# Patient Record
Sex: FEMALE | Race: OTHER | NOT HISPANIC OR LATINO
[De-identification: names, ages, dates, MRNs, and addresses within clinical notes are randomized per-mention and may not be internally consistent; named-entity substitution may affect disease eponyms.]

---

## 2021-05-22 PROBLEM — Z00.00 ENCOUNTER FOR PREVENTIVE HEALTH EXAMINATION: Status: ACTIVE | Noted: 2021-05-22

## 2021-05-24 ENCOUNTER — APPOINTMENT (OUTPATIENT)
Dept: OBGYN | Facility: CLINIC | Age: 76
End: 2021-05-24
Payer: MEDICARE

## 2021-05-24 VITALS — TEMPERATURE: 97.7 F | BODY MASS INDEX: 23.05 KG/M2 | WEIGHT: 135 LBS | HEIGHT: 64 IN

## 2021-05-24 DIAGNOSIS — I10 ESSENTIAL (PRIMARY) HYPERTENSION: ICD-10-CM

## 2021-05-24 LAB
BILIRUB UR QL STRIP: NORMAL
CLARITY UR: CLEAR
GLUCOSE UR-MCNC: NORMAL
HCG UR QL: NORMAL EU/DL
HGB UR QL STRIP.AUTO: NORMAL
KETONES UR-MCNC: NORMAL
LEUKOCYTE ESTERASE UR QL STRIP: NORMAL
NITRITE UR QL STRIP: NORMAL
PH UR STRIP: 5.5
PROT UR STRIP-MCNC: NORMAL
SP GR UR STRIP: 1.03

## 2021-05-24 PROCEDURE — 81003 URINALYSIS AUTO W/O SCOPE: CPT | Mod: QW

## 2021-05-24 PROCEDURE — 99387 INIT PM E/M NEW PAT 65+ YRS: CPT

## 2021-05-24 PROCEDURE — 99072 ADDL SUPL MATRL&STAF TM PHE: CPT

## 2021-06-07 LAB — CYTOLOGY CVX/VAG DOC THIN PREP: ABNORMAL

## 2023-08-24 ENCOUNTER — APPOINTMENT (OUTPATIENT)
Dept: OBGYN | Facility: CLINIC | Age: 78
End: 2023-08-24
Payer: MEDICARE

## 2023-08-24 VITALS — TEMPERATURE: 98.2 F | HEIGHT: 64 IN | BODY MASS INDEX: 21.85 KG/M2 | WEIGHT: 128 LBS

## 2023-08-24 DIAGNOSIS — R39.15 URGENCY OF URINATION: ICD-10-CM

## 2023-08-24 DIAGNOSIS — Z85.3 PERSONAL HISTORY OF MALIGNANT NEOPLASM OF BREAST: ICD-10-CM

## 2023-08-24 DIAGNOSIS — N95.2 POSTMENOPAUSAL ATROPHIC VAGINITIS: ICD-10-CM

## 2023-08-24 DIAGNOSIS — Z01.411 ENCOUNTER FOR GYNECOLOGICAL EXAMINATION (GENERAL) (ROUTINE) WITH ABNORMAL FINDINGS: ICD-10-CM

## 2023-08-24 LAB
BILIRUB UR QL STRIP: NORMAL
GLUCOSE UR-MCNC: NORMAL
HCG UR QL: 0.2 EU/DL
HGB UR QL STRIP.AUTO: NORMAL
KETONES UR-MCNC: NORMAL
LEUKOCYTE ESTERASE UR QL STRIP: NORMAL
NITRITE UR QL STRIP: POSITIVE
PH UR STRIP: 6
PROT UR STRIP-MCNC: NORMAL
SP GR UR STRIP: 1.02

## 2023-08-24 PROCEDURE — 99397 PER PM REEVAL EST PAT 65+ YR: CPT | Mod: 25

## 2023-08-24 PROCEDURE — 81003 URINALYSIS AUTO W/O SCOPE: CPT | Mod: QW

## 2023-08-24 NOTE — PHYSICAL EXAM
[FreeTextEntry7] : Abdomen is soft nontender with no palpable masses. [Normal] : normal [FreeTextEntry1] : External genitalia had no lesions identifiable and the patient was noted to have moderate to severe atrophic changes of the labia and clitoral region. [FreeTextEntry4] : There appeared to be a slight midline cystocele but no prolapse. [FreeTextEntry5] : Cervix appeared normal with moderate to severe atrophic changes but no lesions.  No Pap test was taken. [FreeTextEntry6] : The uterus and adnexa felt to be normal in size and nontender on exam. [FreeTextEntry9] : The patient had no rectal complaints and no rectal exam was performed. carl

## 2023-08-24 NOTE — DISCUSSION/SUMMARY
[FreeTextEntry1] : This is her first GYN exam since May 2021.  She was found to have moderate to severe atrophic changes of the external genitalia and vagina and cervix.  There was a very slight mild cystocele.  Because of her age and history of no abnormal Pap testing in the past I have recommended that Pap testing be terminated at this point in her life.  On urinalysis today she was found to have positive nitrites and some slight blood and white blood cells.  Because of her urgency of urination she will have a urine culture and sensitivity performed.  Any further treatment will be dependent on that result.  Otherwise the patient will return to this office as needed.

## 2023-08-24 NOTE — HISTORY OF PRESENT ILLNESS
[FreeTextEntry1] : This 78-year-old female  3 para 2-0-1-2 is here for an annual GYN exam.  Her last Pap test was normal on May 24, 2021.  She states that she has never had any abnormal Pap testing in the past.  She does have a history of bilateral mastectomy due to breast cancer.  She also has bilateral breast implants.  This was diagnosed and surgery performed approximately 14 years ago.  She is complaining today of some stress urinary incontinence when her bladder is full.  She is noticing urgency of urination on a somewhat regular basis.  She has had 2 spontaneous vaginal deliveries and 1 spontaneous .  Family history includes a mother who had breast cancer.  This patient is noticing a pressure-like feeling in the urethra when she voids.

## 2023-08-24 NOTE — REVIEW OF SYSTEMS
[Urgency] : urgency [Frequency] : frequency [Negative] : Heme/Lymph [FreeTextEntry8] : She does state that when her bladder is full occasionally she has incontinence. [de-identified] : Patient had bilateral mastectomy and bilateral breast implants approximately 14 years ago.

## 2023-09-07 DIAGNOSIS — N30.00 ACUTE CYSTITIS W/OUT HEMATURIA: ICD-10-CM

## 2023-09-07 RX ORDER — CIPROFLOXACIN HYDROCHLORIDE 500 MG/1
500 TABLET, FILM COATED ORAL TWICE DAILY
Qty: 14 | Refills: 0 | Status: ACTIVE | COMMUNITY
Start: 2023-09-07 | End: 1900-01-01